# Patient Record
Sex: MALE | Race: WHITE | NOT HISPANIC OR LATINO | Employment: OTHER | URBAN - METROPOLITAN AREA
[De-identification: names, ages, dates, MRNs, and addresses within clinical notes are randomized per-mention and may not be internally consistent; named-entity substitution may affect disease eponyms.]

---

## 2017-01-02 ENCOUNTER — COMMUNICATION - HEALTHEAST (OUTPATIENT)
Dept: INTERNAL MEDICINE | Facility: CLINIC | Age: 57
End: 2017-01-02

## 2017-01-02 DIAGNOSIS — E78.5 HYPERLIPIDEMIA: ICD-10-CM

## 2017-06-13 ENCOUNTER — COMMUNICATION - HEALTHEAST (OUTPATIENT)
Dept: INTERNAL MEDICINE | Facility: CLINIC | Age: 57
End: 2017-06-13

## 2017-06-13 DIAGNOSIS — M10.9 GOUT: ICD-10-CM

## 2017-06-26 ENCOUNTER — COMMUNICATION - HEALTHEAST (OUTPATIENT)
Dept: INTERNAL MEDICINE | Facility: CLINIC | Age: 57
End: 2017-06-26

## 2018-04-10 ENCOUNTER — RECORDS - HEALTHEAST (OUTPATIENT)
Dept: ADMINISTRATIVE | Facility: OTHER | Age: 58
End: 2018-04-10

## 2020-02-10 ENCOUNTER — COMMUNICATION - HEALTHEAST (OUTPATIENT)
Dept: INTERNAL MEDICINE | Facility: CLINIC | Age: 60
End: 2020-02-10

## 2020-02-11 ENCOUNTER — OFFICE VISIT - HEALTHEAST (OUTPATIENT)
Dept: INTERNAL MEDICINE | Facility: CLINIC | Age: 60
End: 2020-02-11

## 2020-02-11 DIAGNOSIS — R03.0 ELEVATED BLOOD PRESSURE READING WITHOUT DIAGNOSIS OF HYPERTENSION: ICD-10-CM

## 2020-02-11 DIAGNOSIS — Z71.6 ENCOUNTER FOR TOBACCO USE CESSATION COUNSELING: ICD-10-CM

## 2020-02-11 DIAGNOSIS — M79.674 PAIN OF TOE OF RIGHT FOOT: ICD-10-CM

## 2020-02-11 DIAGNOSIS — E66.09 CLASS 2 OBESITY DUE TO EXCESS CALORIES WITHOUT SERIOUS COMORBIDITY WITH BODY MASS INDEX (BMI) OF 35.0 TO 35.9 IN ADULT: ICD-10-CM

## 2020-02-11 DIAGNOSIS — E66.812 CLASS 2 OBESITY DUE TO EXCESS CALORIES WITHOUT SERIOUS COMORBIDITY WITH BODY MASS INDEX (BMI) OF 35.0 TO 35.9 IN ADULT: ICD-10-CM

## 2020-02-11 LAB
ANION GAP SERPL CALCULATED.3IONS-SCNC: 7 MMOL/L (ref 5–18)
BUN SERPL-MCNC: 16 MG/DL (ref 8–22)
CALCIUM SERPL-MCNC: 9.3 MG/DL (ref 8.5–10.5)
CHLORIDE BLD-SCNC: 105 MMOL/L (ref 98–107)
CHOLEST SERPL-MCNC: 134 MG/DL
CO2 SERPL-SCNC: 27 MMOL/L (ref 22–31)
CREAT SERPL-MCNC: 1.11 MG/DL (ref 0.7–1.3)
FASTING STATUS PATIENT QL REPORTED: YES
GFR SERPL CREATININE-BSD FRML MDRD: >60 ML/MIN/1.73M2
GLUCOSE BLD-MCNC: 109 MG/DL (ref 70–125)
HBA1C MFR BLD: 5.7 % (ref 3.5–6)
HDLC SERPL-MCNC: 40 MG/DL
LDLC SERPL CALC-MCNC: 79 MG/DL
POTASSIUM BLD-SCNC: 5.1 MMOL/L (ref 3.5–5)
SODIUM SERPL-SCNC: 139 MMOL/L (ref 136–145)
TRIGL SERPL-MCNC: 75 MG/DL

## 2020-02-11 RX ORDER — VARENICLINE TARTRATE 1 MG/1
TABLET, FILM COATED ORAL
Qty: 60 TABLET | Refills: 2 | Status: SHIPPED | OUTPATIENT
Start: 2020-02-11 | End: 2022-07-19

## 2020-02-11 ASSESSMENT — MIFFLIN-ST. JEOR: SCORE: 1903.08

## 2020-02-11 ASSESSMENT — PATIENT HEALTH QUESTIONNAIRE - PHQ9: SUM OF ALL RESPONSES TO PHQ QUESTIONS 1-9: 3

## 2020-03-27 ENCOUNTER — COMMUNICATION - HEALTHEAST (OUTPATIENT)
Dept: INTERNAL MEDICINE | Facility: CLINIC | Age: 60
End: 2020-03-27

## 2020-03-27 ENCOUNTER — OFFICE VISIT - HEALTHEAST (OUTPATIENT)
Dept: INTERNAL MEDICINE | Facility: CLINIC | Age: 60
End: 2020-03-27

## 2020-03-27 DIAGNOSIS — Z87.891 FORMER TOBACCO USE: ICD-10-CM

## 2020-03-27 DIAGNOSIS — E66.812 CLASS 2 SEVERE OBESITY DUE TO EXCESS CALORIES WITH SERIOUS COMORBIDITY AND BODY MASS INDEX (BMI) OF 35.0 TO 35.9 IN ADULT (H): ICD-10-CM

## 2020-03-27 DIAGNOSIS — I25.10 CORONARY ARTERY DISEASE INVOLVING NATIVE CORONARY ARTERY OF NATIVE HEART WITHOUT ANGINA PECTORIS: ICD-10-CM

## 2020-03-27 DIAGNOSIS — E66.01 CLASS 2 SEVERE OBESITY DUE TO EXCESS CALORIES WITH SERIOUS COMORBIDITY AND BODY MASS INDEX (BMI) OF 35.0 TO 35.9 IN ADULT (H): ICD-10-CM

## 2021-05-27 ASSESSMENT — PATIENT HEALTH QUESTIONNAIRE - PHQ9: SUM OF ALL RESPONSES TO PHQ QUESTIONS 1-9: 3

## 2021-05-29 ENCOUNTER — RECORDS - HEALTHEAST (OUTPATIENT)
Dept: ADMINISTRATIVE | Facility: CLINIC | Age: 61
End: 2021-05-29

## 2021-05-30 ENCOUNTER — RECORDS - HEALTHEAST (OUTPATIENT)
Dept: ADMINISTRATIVE | Facility: CLINIC | Age: 61
End: 2021-05-30

## 2021-06-03 ENCOUNTER — RECORDS - HEALTHEAST (OUTPATIENT)
Dept: ADMINISTRATIVE | Facility: CLINIC | Age: 61
End: 2021-06-03

## 2021-06-04 VITALS
DIASTOLIC BLOOD PRESSURE: 82 MMHG | SYSTOLIC BLOOD PRESSURE: 132 MMHG | WEIGHT: 242 LBS | HEIGHT: 69 IN | BODY MASS INDEX: 35.84 KG/M2 | OXYGEN SATURATION: 99 % | HEART RATE: 60 BPM

## 2021-06-06 NOTE — TELEPHONE ENCOUNTER
Who is calling:  Patient, Levon Barron  Reason for Call:  Would like to know if Dr. Pinto would accept him as a new patient.  Date of last appointment with primary care: 2016  Okay to leave a detailed message: Yes

## 2021-06-06 NOTE — PROGRESS NOTES
[facility] Clinic Note  Levon Barron   59 y.o. male    Date of Visit: 2/11/2020  Chief Complaint   Patient presents with     Foot Pain     RT foot pain - more with walking     Smoking Cessation     Wants RX for chantix     Assessment/Plan  1. Encounter for tobacco use cessation counseling  PHQ-9 of 3.  Counseled on risk of developing major depressive symptoms.  Will check BMP to assess creatinine clearance.  We will follow-up in 6 weeks to evaluate progress.  Quit date will be after 1 week from initiation of medication.  - Basic Metabolic Panel; Future  - varenicline (CHANTIX STARTING MONTH BOX) 0.5 mg (11)- 1 mg (42) tablet; 1 wk before you stop smoking take 0.5mg daily on days 1-3, 0.5mg 2 times each day on days 4-7, then 1mg 2 times daily.  Dispense: 53 tablet; Refill: 0  - varenicline (CHANTIX) 1 mg tablet; Take 1 tab by mouth two times a day. Take after eating with a full glass of water. NOTE: Dispense as maintenance for refills only.  Dispense: 60 tablet; Refill: 2  - Basic Metabolic Panel    2. Class 2 obesity due to excess calories without serious comorbidity with body mass index (BMI) of 35.0 to 35.9 in adult  3. Elevated blood pressure reading without diagnosis of hypertension  On 5 mg of rosuvastatin, as higher doses resulted in MSK symptoms.  Provided patient instructions on lifestyle changes including diet and exercise, salt restriction and weight loss in order to help improve systolic blood pressure and overall cardiovascular health.  Encouraged him on goal to try to see smoking moving forward.  We will also check for diabetes.  - Glycosylated Hemoglobin A1c  - Lipid Cascade    4. Pain of toe of right foot  Likely hyperextension of the flexor digitorum brevis tendons.  Counseled on rest, ice, massage and acetaminophen.  Counseled to limit use of NSAIDs in the setting of his cardiovascular disease.       Much or all of the text in this note was generated through the use of Dragon  Dictate voice-to-text software. Errors in spelling or words which seem out of context are unintentional. Sound alike errors, in particular, may have escaped editing  Zelalem Rockwell MD    Return if symptoms worsen or fail to improve.    Subjective  This 59 y.o. old male presents after being lost to follow-up. Sees Dr. Levon Bennett (last seen 10/22/2019, /84, 241 pounds) at [facility].  Last seen in  clinic 4/22/2016.  History pertinent for CAD s/p PCI to proximal LAD 7/2015 (has apical LAD disease that was left alone, later underwent PCI to left circumflex extending into the second obtuse marginal branch), frequent PVCs s/p ablation in 2016, obesity, hypercholesterolemia, anxiety, former smoker status [Echo 2015 (EF 55%, borderline left atrial dilatation, mild TR), cardiac MR (mild increased concentric wall thickness, EF 73%), Holter 04/20/18 (SR with rare, noncomplex ectopy)].  Last lipids done in 2016,  HDL 46.  Here to discuss that his wife was dx with stage 4 cancer and due to hardship with that, also with work, etc. 9 months ago resumed smoking. Was on Chantix in the past, which helped stopped smoking in 2015. currently 2 ppw, and not on the weekend. Endorses anxiety 2/2 plavix, and an antidepressant made it worse. PHQ9.   Also complains of right foot pain. Endorses lots of walking - work and with family dog- 10 to 15K steps a day. Plantar felxion causes around the PIP joint, more below the toes. Present for a couple of weeks. No trauma, new footwear, swelling, skin changes and non tender. No numbness or tingling.on hurt when he walks, does not prevent him from walking and 2/10 in severity.     ROS A comprehensive review of systems was performed and was otherwise negative    Medications, allergies, and problem list were reviewed and updated    Exam  General appearance: Pleasant, nontoxic-appearing, no acute distress, alert and oriented x4  Vitals:     02/11/20 0804   BP: 132/82   Pulse: 60   SpO2: 99%   RESPIRATORY: Bilaterally with no crackles, wheezing or rhonchi  CARDIOVASCULAR: Regular S1 and S2.  Radial pulses intact.  No edema.  GASTROINTESTINAL: NABS, soft, nontender, nondistended, no hepatomegaly  MUSCULOSKELETAL: Skeletal configuration was normal and muscle mass was normal for age. Joint appearance was overall normal.  Reproduction of pain proximal to the PIP joint, along the proximal phalanges with extension of the second and third toe on the right.  No palpable mass, lesion or ulcer, and no tenderness.  Squeeze test along metatarsals with no reproducible pain.  LYMPHATIC: There were no enlarged nodes.  SKIN/HAIR/NAILS: Hair and nails were normal.  Mild onychomycosis in the right great toe.  No maceration or ulcers between toes on the right.  NEUROLOGIC: Alert and oriented to person, place, time, and circumstance. Speech was normal.   PSYCHIATRIC:  Mood and affect were normal and the patient had normal recent and remote memory. The patient's judgment and insight were normal.  PHQ 9 of 3    Additional Information   Current Outpatient Medications   Medication Sig Dispense Refill     aspirin 81 MG EC tablet Take 81 mg by mouth daily.       rosuvastatin (CRESTOR) 5 MG tablet TAKE 1 TABLET BY MOUTH AT BEDTIME 30 tablet 0     co-enzyme Q-10 30 mg capsule Take 30 mg by mouth daily.       nitroglycerin (NITROSTAT) 0.4 MG SL tablet Place 0.4 mg under the tongue.       varenicline (CHANTIX STARTING MONTH BOX) 0.5 mg (11)- 1 mg (42) tablet 1 wk before you stop smoking take 0.5mg daily on days 1-3, 0.5mg 2 times each day on days 4-7, then 1mg 2 times daily. 53 tablet 0     varenicline (CHANTIX) 1 mg tablet Take 1 tab by mouth two times a day. Take after eating with a full glass of water. NOTE: Dispense as maintenance for refills only. 60 tablet 2     No current facility-administered medications for this visit.      Allergies   Allergen Reactions     Atorvastatin  Myalgia     Pravastatin Myalgia     Statins-Hmg-Coa Reductase Inhibitors Myalgia     Intolerance     Social History     Social History Narrative    . Manda Arteaga current dealing with stage 4 cancer. 2 sons. 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

## 2021-06-06 NOTE — TELEPHONE ENCOUNTER
Called pt and advised that he can be seen and he said he will call back when ready to make appt.    He did see Dr Rockwell at [facility] today.  I advised that Dr Pinto could do the follow up if he wanted him to.

## 2021-06-07 NOTE — PROGRESS NOTES
"Levon Barron is a 59 y.o. male who is being evaluated via a billable telephone visit.      The patient has been notified of following:     \"This telephone visit will be conducted via a call between you and your physician/provider. We have found that certain health care needs can be provided without the need for a physical exam.  This service lets us provide the care you need with a short phone conversation.  If a prescription is necessary we can send it directly to your pharmacy.  If lab work is needed we can place an order for that and you can then stop by our lab to have the test done at a later time.     If during the course of the call the physician/provider feels a telephone visit is not appropriate, you will not be charged for this service.\"     Levon Barron complains of    Chief Complaint   Patient presents with     Follow-up     foot pain gone - Chantix going well - no side effects     I have reviewed and updated the patient's Past Medical History, Social History, Family History and Medication List.    ALLERGIES  Atorvastatin; Pravastatin; and Statins-hmg-coa reductase inhibitors    Additional provider notes:   States he is doing well overall. Is sheltering in place. Denies any exposure to people with Covid-19. Has a tickle in his throat with a clearing cough, for roughly 1 week. Otherwise no f/s/c. Only in the AM and no foul test taste in mouth and resolves by the midday. Somewhat related to laying flat.   From a tobacco cessation standpoint, states the Chantix is working well for him. States he has not had a cigarette in 3-4 weeks. Currently on 1 mg twice a day. Mood is good. Endorses some stress with the covid-19. Thinks his last cigarettes was 2/21/20.   Trying to lose some weight, and it is difficult for him right now as he is homebound. Difficult to make goal of 43009 steps. States that his night time appetite is massive. Has a gym, but prefers to be outside. [place] walking through, 2 to 3 times a day.   Pain in the right foot has improved with conservative management.     Assessment/Plan:  1. Former tobacco use  Doing great.  Discussed completing therapy after 12 weeks of Chantix.  Changed history to former tobacco use.  End date roughly 5/21/2020.    2. Class 2 severe obesity due to excess calories with serious comorbidity and body mass index (BMI) of 35.0 to 35.9 in adult (H)  3. Coronary artery disease involving native coronary artery of native heart without angina pectoris  Has history of elevated blood pressures in the past.  Counseled on importance of incorporating some light strength training, considering intermittent fasting as well as avoiding foods with a high glycemic index/non-peritoneal foods as they are designed to promote continuous eating.  We will try to eat more foods with higher water content and more soluble fiber.  Also recommended developing goals such as 2 pounds per week to help motivate him to adhere to these small lifestyle changes noted to lose meaningful weight.  This will also decrease his risk of developing high cholesterol and will probably also decrease his ambulatory blood pressure readings    Phone call duration: 23 minutes   8:18 AM-8:41 AM

## 2021-06-18 NOTE — PATIENT INSTRUCTIONS - HE
Patient Instructions by Zelalem Rockwell MD at 2/11/2020  8:00 AM     Author: Zelalem Rockwell MD Service: -- Author Type: Physician    Filed: 2/11/2020  8:42 AM Encounter Date: 2/11/2020 Status: Signed    : Zelalem Rockwell MD (Physician)       Patient Education   Regarding your right foot, I think you hyperextended the toe flexors. This can improve with rest, massage, ice. Try to use acetaminophen for the pain, and limit use of NSAIDs in general. Aspirin is an NSAID, but 81 mg is okay and the pros outweigh the cons  Established High Blood Pressure    High blood pressure (hypertension) is a chronic disease. Often, healthcare providers dont know what causes it. But it can be caused by certain health conditions and medicines.  If you have high blood pressure, you may not have any symptoms. If you do have symptoms, they may include headache, dizziness, changes in your vision, chest pain, and shortness of breath. But even without symptoms, high blood pressure thats not treated raises your risk for heart attack, heart failure, and stroke. High blood pressure is a serious health risk and shouldnt be ignored.  Blood pressure measurements are given as 2 numbers. Systolic blood pressure is the upper number. This is the pressure when the heart contracts. Diastolic blood pressure is the lower number. This is the pressure when the heart relaxes between beats. You will see your blood pressure readings written together. For example, a person with a systolic pressure of 118 and a diastolic pressure of 78 will have 118/78 written in the medical record.  Blood pressure is categorized as normal, elevated, or stage 1 or stage 2 high blood pressure:    Normal blood pressure is systolic of less than 120 and diastolic of less than 80 (120/80)    Elevated blood pressure is systolic of 120 to 129 and diastolic less than 80    Stage 1 high blood pressure is systolic is 130 to 139 or diastolic between 80  to 89  Home care  If you have high blood pressure, follow these home care guidelines to help lower your blood pressure. If you are taking medicines for high blood pressure, these methods may reduce or end your need for medicines in the future.    Start a weight-loss program if you are overweight.    Cut back on how much salt you get in your diet. Heres how to do this:  ? Dont eat foods that have a lot of salt. These include olives, pickles, smoked meats, and salted potato chips.  ? Dont add salt to your food at the table.  ? Use only small amounts of salt when cooking.    Start an exercise program. Talk with your healthcare provider about the type of exercise program that would be best for you. It doesn't have to be hard. Even brisk walking for 20 minutes 3 times a week is a good form of exercise.    Dont take medicines that stimulate the heart. This includes many over-the-counter cold and sinus decongestant pills and sprays, as well as diet pills. Check the warnings about high blood pressure on the label. Before buying any over-the-counter medicines or supplements, always ask the pharmacist about the product's potential interaction with your high blood pressure and your high blood pressure medicines.    Stimulants such as amphetamine or cocaine could be deadly for someone with high blood pressure. Never take these.    Limit how much caffeine you get in your diet. Switch to caffeine-free products.    Stop smoking. If you are a long-time smoker, this can be hard. Talk to your healthcare provider about medicines and nicotine replacement options to help you. Also, enroll in a stop-smoking program to make it more likely that you will quit for good.    Learn how to handle stress. This is an important part of any program to lower blood pressure. Learn about relaxation methods like meditation, yoga, or biofeedback.    If your provider prescribed medicines, take them exactly as directed. Missing doses may cause your blood  pressure get out of control.    If you miss a dose or doses, check with your healthcare provider or pharmacist about what to do.    Consider buying an automatic blood pressure machine to check your blood pressure at home. Ask your provider for a recommendation. You can get one of these at most pharmacies.  The American Heart Association recommends the following guidelines for home blood pressure monitoring:    Don't smoke or drink coffee for 30 minutes before taking your blood pressure.    Go to the bathroom before the test.    Relax for 5 minutes before taking the measurement.    Sit with your back supported (don't sit on a couch or soft chair); keep your feet on the floor uncrossed. Place your arm on a solid flat surface (like a table) with the upper part of the arm at heart level. Place the middle of the cuff directly above the bend of the elbow. Check the monitor's instruction manual for an illustration.    Take multiple readings. When you measure, take 2 to 3 readings one minute apart and record all of the results.    Take your blood pressure at the same time every day, or as your healthcare provider recommends.    Record the date, time, and blood pressure reading.    Take the record with you to your next medical appointment. If your blood pressure monitor has a built-in memory, simply take the monitor with you to your next appointment.    Call your provider if you have several high readings. Don't be frightened by a single high blood pressure reading, but if you get several high readings, check in with your healthcare provider.    Note: When blood pressure reaches a systolic (top number) of 180 or higher OR diastolic (bottom number) of 110 or higher, seek emergency medical treatment.  Follow-up care  You will need to see your healthcare provider regularly. This is to check your blood pressure and to make changes to your medicines. Make a follow-up appointment as directed. Bring the record of your home blood  pressure readings to the appointment.  When to seek medical advice  Call your healthcare provider right away if any of these occur:    Blood pressure reaches a systolic (upper number) of 180 or higher OR a diastolic (bottom number) of 110 or higher    Chest pain or shortness of breath    Severe headache    Throbbing or rushing sound in the ears    Nosebleed    Sudden severe pain in your belly (abdomen)    Extreme drowsiness, confusion, or fainting    Dizziness or spinning sensation (vertigo)    Weakness of an arm or leg or one side of the face    You have problems speaking or seeing

## 2021-06-26 ENCOUNTER — HEALTH MAINTENANCE LETTER (OUTPATIENT)
Age: 61
End: 2021-06-26

## 2021-10-16 ENCOUNTER — HEALTH MAINTENANCE LETTER (OUTPATIENT)
Age: 61
End: 2021-10-16

## 2022-05-05 ENCOUNTER — TRANSFERRED RECORDS (OUTPATIENT)
Dept: HEALTH INFORMATION MANAGEMENT | Facility: CLINIC | Age: 62
End: 2022-05-05
Payer: COMMERCIAL

## 2022-06-09 ENCOUNTER — TRANSFERRED RECORDS (OUTPATIENT)
Dept: HEALTH INFORMATION MANAGEMENT | Facility: CLINIC | Age: 62
End: 2022-06-09

## 2022-07-14 ENCOUNTER — OFFICE VISIT (OUTPATIENT)
Dept: URGENT CARE | Facility: URGENT CARE | Age: 62
End: 2022-07-14
Payer: COMMERCIAL

## 2022-07-14 VITALS
OXYGEN SATURATION: 97 % | TEMPERATURE: 97 F | HEART RATE: 66 BPM | SYSTOLIC BLOOD PRESSURE: 127 MMHG | DIASTOLIC BLOOD PRESSURE: 87 MMHG

## 2022-07-14 DIAGNOSIS — M10.9 ACUTE GOUTY ARTHRITIS: Primary | ICD-10-CM

## 2022-07-14 PROCEDURE — 99214 OFFICE O/P EST MOD 30 MIN: CPT | Performed by: PHYSICIAN ASSISTANT

## 2022-07-14 RX ORDER — PREDNISONE 20 MG/1
40 TABLET ORAL DAILY
Qty: 10 TABLET | Refills: 0 | Status: SHIPPED | OUTPATIENT
Start: 2022-07-14 | End: 2022-07-19

## 2022-07-14 NOTE — PATIENT INSTRUCTIONS
Patient was educated on the natural course of condition. Gout is caused by having too much uric acid chemical in the blood. Uric acid can form sharp crystals that build up in joints which causes a gout attack. Take medications as prescribed. Side effects discussed. Conservative measures discussed including warm compresses, low purine diet, and over-the-counter analgesics (Tylenol or Ibuprofen). See your primary care provider if symptoms worsen or do not improve in five days. Seek emergency care if you develop severe pain, or fever.

## 2022-07-14 NOTE — PROGRESS NOTES
URGENT CARE VISIT:    SUBJECTIVE:   Chief Complaint   Patient presents with     Arthritis     gout      Levon Barron is a 61 year old male who presents with a chief complaint of left great toe pain and redness.  Symptoms began 10 day(s) ago, are moderate and sudden onset  Pain has improved.  Pain exacerbated by touch. Relieved by rest.  He treated it initially with nothing. This is not the first time this type of injury has occurred to this patient. He has history of gout.    PMH:   Past Medical History:   Diagnosis Date     Former tobacco use      Hyperlipidemia      Obesity      Allergies: Atorvastatin, Pravastatin, and Statins-hmg-coa reductase inhibitors [hmg-coa-r inhibitors]   Medications:   Current Outpatient Medications   Medication Sig Dispense Refill     predniSONE (DELTASONE) 20 MG tablet Take 2 tablets (40 mg) by mouth daily for 5 days 10 tablet 0     aspirin 81 MG EC tablet [ASPIRIN 81 MG EC TABLET] Take 81 mg by mouth daily.       co-enzyme Q-10 30 mg capsule [CO-ENZYME Q-10 30 MG CAPSULE] Take 30 mg by mouth daily.       nitroglycerin (NITROSTAT) 0.4 MG SL tablet [NITROGLYCERIN (NITROSTAT) 0.4 MG SL TABLET] Place 0.4 mg under the tongue.       rosuvastatin (CRESTOR) 5 MG tablet [ROSUVASTATIN (CRESTOR) 5 MG TABLET] TAKE 1 TABLET BY MOUTH AT BEDTIME 30 tablet 0     varenicline (CHANTIX STARTING MONTH BOX) 0.5 mg (11)- 1 mg (42) tablet [VARENICLINE (CHANTIX STARTING MONTH BOX) 0.5 MG (11)- 1 MG (42) TABLET] 1 wk before you stop smoking take 0.5mg daily on days 1-3, 0.5mg 2 times each day on days 4-7, then 1mg 2 times daily. 53 tablet 0     varenicline (CHANTIX) 1 mg tablet [VARENICLINE (CHANTIX) 1 MG TABLET] Take 1 tab by mouth two times a day. Take after eating with a full glass of water. NOTE: Dispense as maintenance for refills only. 60 tablet 2     Social History:   Social History     Tobacco Use     Smoking status: Former Smoker     Packs/day: 0.15     Types: Cigarettes, Cigarettes     Smokeless  tobacco: Never Used   Substance Use Topics     Alcohol use: Yes     Alcohol/week: 1.7 standard drinks       ROS:  Review of systems negative except as stated above.    OBJECTIVE:  /87 (BP Location: Right arm, Patient Position: Sitting, Cuff Size: Adult Large)   Pulse 66   Temp 97  F (36.1  C)   SpO2 97%   GENERAL APPEARANCE: healthy, alert and no distress  MUSCULOSKELETAL: mild TTP over left great MTP joint  EXTREMITIES: peripheral pulses normal  SKIN: moderate erythema and edema over left great MTP joint  NEURO: sensation intact       ASSESSMENT:    ICD-10-CM    1. Acute gouty arthritis  M10.9 predniSONE (DELTASONE) 20 MG tablet       PLAN:  Patient Instructions   Patient was educated on the natural course of condition. Gout is caused by having too much uric acid chemical in the blood. Uric acid can form sharp crystals that build up in joints which causes a gout attack. Take medications as prescribed. Side effects discussed. Conservative measures discussed including warm compresses, low purine diet, and over-the-counter analgesics (Tylenol or Ibuprofen). See your primary care provider if symptoms worsen or do not improve in five days. Seek emergency care if you develop severe pain, or fever.  Patient verbalized understanding and is agreeable to plan. The patient was discharged ambulatory and in stable condition.    Denise Bah PA-C on 7/14/2022 at 6:31 PM

## 2022-07-17 ENCOUNTER — HEALTH MAINTENANCE LETTER (OUTPATIENT)
Age: 62
End: 2022-07-17

## 2022-07-18 ASSESSMENT — ENCOUNTER SYMPTOMS
MYALGIAS: 0
EYE PAIN: 0
NERVOUS/ANXIOUS: 0
COUGH: 0
CHILLS: 0
HEADACHES: 0
NAUSEA: 0
DYSURIA: 0
DIARRHEA: 0
ABDOMINAL PAIN: 0
DIZZINESS: 0
PALPITATIONS: 0
HEMATURIA: 0
SORE THROAT: 0
FEVER: 0
HEARTBURN: 0
SHORTNESS OF BREATH: 0
JOINT SWELLING: 0
HEMATOCHEZIA: 0
FREQUENCY: 0
WEAKNESS: 0
ARTHRALGIAS: 1
PARESTHESIAS: 0
CONSTIPATION: 0

## 2022-07-19 ENCOUNTER — OFFICE VISIT (OUTPATIENT)
Dept: INTERNAL MEDICINE | Facility: CLINIC | Age: 62
End: 2022-07-19
Payer: COMMERCIAL

## 2022-07-19 VITALS
SYSTOLIC BLOOD PRESSURE: 116 MMHG | HEART RATE: 61 BPM | DIASTOLIC BLOOD PRESSURE: 78 MMHG | HEIGHT: 69 IN | WEIGHT: 231.8 LBS | TEMPERATURE: 97.1 F | OXYGEN SATURATION: 97 % | BODY MASS INDEX: 34.33 KG/M2

## 2022-07-19 DIAGNOSIS — Z11.59 NEED FOR HEPATITIS C SCREENING TEST: ICD-10-CM

## 2022-07-19 DIAGNOSIS — E78.5 HYPERLIPIDEMIA LDL GOAL <70: ICD-10-CM

## 2022-07-19 DIAGNOSIS — Z86.0100 HISTORY OF COLONIC POLYPS: ICD-10-CM

## 2022-07-19 DIAGNOSIS — E66.811 CLASS 1 OBESITY WITH SERIOUS COMORBIDITY AND BODY MASS INDEX (BMI) OF 34.0 TO 34.9 IN ADULT, UNSPECIFIED OBESITY TYPE: ICD-10-CM

## 2022-07-19 DIAGNOSIS — Z12.5 SCREENING FOR PROSTATE CANCER: ICD-10-CM

## 2022-07-19 DIAGNOSIS — Z00.01 ENCOUNTER FOR ROUTINE ADULT MEDICAL EXAM WITH ABNORMAL FINDINGS: ICD-10-CM

## 2022-07-19 DIAGNOSIS — M10.9 ACUTE GOUT OF LEFT FOOT, UNSPECIFIED CAUSE: ICD-10-CM

## 2022-07-19 LAB
ERYTHROCYTE [DISTWIDTH] IN BLOOD BY AUTOMATED COUNT: 13.2 % (ref 10–15)
HCT VFR BLD AUTO: 48.2 % (ref 40–53)
HCV AB SERPL QL IA: NONREACTIVE
HGB BLD-MCNC: 16.2 G/DL (ref 13.3–17.7)
MCH RBC QN AUTO: 30.5 PG (ref 26.5–33)
MCHC RBC AUTO-ENTMCNC: 33.6 G/DL (ref 31.5–36.5)
MCV RBC AUTO: 91 FL (ref 78–100)
PLATELET # BLD AUTO: 230 10E3/UL (ref 150–450)
RBC # BLD AUTO: 5.31 10E6/UL (ref 4.4–5.9)
WBC # BLD AUTO: 6.7 10E3/UL (ref 4–11)

## 2022-07-19 PROCEDURE — 80053 COMPREHEN METABOLIC PANEL: CPT | Performed by: INTERNAL MEDICINE

## 2022-07-19 PROCEDURE — 99396 PREV VISIT EST AGE 40-64: CPT | Performed by: INTERNAL MEDICINE

## 2022-07-19 PROCEDURE — 84550 ASSAY OF BLOOD/URIC ACID: CPT | Performed by: INTERNAL MEDICINE

## 2022-07-19 PROCEDURE — 80061 LIPID PANEL: CPT | Performed by: INTERNAL MEDICINE

## 2022-07-19 PROCEDURE — 86803 HEPATITIS C AB TEST: CPT | Performed by: INTERNAL MEDICINE

## 2022-07-19 PROCEDURE — 36415 COLL VENOUS BLD VENIPUNCTURE: CPT | Performed by: INTERNAL MEDICINE

## 2022-07-19 PROCEDURE — 84439 ASSAY OF FREE THYROXINE: CPT | Performed by: INTERNAL MEDICINE

## 2022-07-19 PROCEDURE — 99213 OFFICE O/P EST LOW 20 MIN: CPT | Mod: 25 | Performed by: INTERNAL MEDICINE

## 2022-07-19 PROCEDURE — 85027 COMPLETE CBC AUTOMATED: CPT | Performed by: INTERNAL MEDICINE

## 2022-07-19 PROCEDURE — G0103 PSA SCREENING: HCPCS | Performed by: INTERNAL MEDICINE

## 2022-07-19 PROCEDURE — 84443 ASSAY THYROID STIM HORMONE: CPT | Performed by: INTERNAL MEDICINE

## 2022-07-19 ASSESSMENT — ENCOUNTER SYMPTOMS
WEAKNESS: 0
SORE THROAT: 0
JOINT SWELLING: 0
HEADACHES: 0
MYALGIAS: 0
PARESTHESIAS: 0
FREQUENCY: 0
HEARTBURN: 0
DIARRHEA: 0
ARTHRALGIAS: 1
EYE PAIN: 0
FEVER: 0
HEMATURIA: 0
COUGH: 0
CONSTIPATION: 0
CHILLS: 0
ABDOMINAL PAIN: 0
SHORTNESS OF BREATH: 0
HEMATOCHEZIA: 0
PALPITATIONS: 0
NERVOUS/ANXIOUS: 0
DIZZINESS: 0
NAUSEA: 0
DYSURIA: 0

## 2022-07-19 NOTE — PATIENT INSTRUCTIONS
Continue current meds. Will address refill after labs  Labs today as ordered  Reduce calorie/carbohydrate (sugar, bread, potato, pasta, rice, alcohol etc)  intake in diet.  Increase color on your plate with fruits and vegetables. Increase  frequency of walking or other aerobic exercise as able (goal is daily)  Vaccinations: Tetanus (Td) vaccine - get at pharmacy  Check with insurance or speak with your pharmacist re: Shingrix vaccine coverage for shingles prevention.  This is a 2 shot series done 2-6 months apart  I would recommend you receive an influenza (flu) vaccine  this Fall (October)   I would recommend a covid booster vaccination. You may have it done at any pharmacy. If you wish to have it done at a [facility] pharmacy, then go to [email or web address] to schedule a vaccination appointment  Put alcohol aside for now to  Reduce uric acid. If recurrent gout flares, then possible Allopurinol for prevention  Pt was informed regarding extra E&M billing for management of new or established medical issues not related to today's wellness visit

## 2022-07-22 LAB
ALBUMIN SERPL-MCNC: 7.5 G/DL (ref 3.4–5)
ALP SERPL-CCNC: 52 U/L (ref 40–150)
ALT SERPL W P-5'-P-CCNC: 35 U/L (ref 0–70)
ANION GAP SERPL CALCULATED.3IONS-SCNC: 5 MMOL/L (ref 3–14)
AST SERPL W P-5'-P-CCNC: 21 U/L (ref 0–45)
BILIRUB SERPL-MCNC: 0.3 MG/DL (ref 0.2–1.3)
BUN SERPL-MCNC: 25 MG/DL (ref 7–30)
CALCIUM SERPL-MCNC: 9.4 MG/DL (ref 8.5–10.1)
CHLORIDE BLD-SCNC: 105 MMOL/L (ref 94–109)
CHOLEST SERPL-MCNC: 118 MG/DL
CO2 SERPL-SCNC: 27 MMOL/L (ref 20–32)
CREAT SERPL-MCNC: 1.11 MG/DL (ref 0.66–1.25)
FASTING STATUS PATIENT QL REPORTED: NO
GFR SERPL CREATININE-BSD FRML MDRD: 76 ML/MIN/1.73M2
GLUCOSE BLD-MCNC: 81 MG/DL (ref 70–99)
HDLC SERPL-MCNC: 49 MG/DL
LDLC SERPL CALC-MCNC: 56 MG/DL
NONHDLC SERPL-MCNC: 69 MG/DL
POTASSIUM BLD-SCNC: 4.9 MMOL/L (ref 3.4–5.3)
PROT SERPL-MCNC: 7 G/DL (ref 6.8–8.8)
SODIUM SERPL-SCNC: 137 MMOL/L (ref 133–144)
TRIGL SERPL-MCNC: 66 MG/DL
TSH SERPL DL<=0.005 MIU/L-ACNC: 79.18 MU/L (ref 0.4–4)
URATE SERPL-MCNC: 7.3 MG/DL (ref 3.5–7.2)

## 2022-07-23 LAB
PSA SERPL-MCNC: 0.76 UG/L (ref 0–4)
T4 FREE SERPL-MCNC: 0.67 NG/DL (ref 0.76–1.46)

## 2022-09-25 ENCOUNTER — HEALTH MAINTENANCE LETTER (OUTPATIENT)
Age: 62
End: 2022-09-25

## 2023-02-23 ENCOUNTER — NURSE TRIAGE (OUTPATIENT)
Dept: NURSING | Facility: CLINIC | Age: 63
End: 2023-02-23
Payer: COMMERCIAL

## 2023-02-23 NOTE — TELEPHONE ENCOUNTER
Nurse Triage SBAR    Is this a 2nd Level Triage? NO    Situation: Gout flare for 3-4 days    Background: consent on file - with patient  In [place] currently- this was not relayed to writer until after triage has been completed.     Assessment: gout flare- right great toe   3-4 days ago  Rating pain 9/10 - states he is limping due to the pain  Taken ibuprofen for pain- not helping   States he has taken prednisone in the past and would like a prescription.     Protocol Recommended Disposition:   Go To Office Now, See More Appropriate Protocol    Recommendation: Advised that they should be seen - wife indicates that they are out of state in [place] currently- advised that due to licensing restrictions triage cannot continue. Advised that providers have similar licensing restrictions and will not be able to assist and that patient needs to be seen where they are at. Wife verbalizes understanding and then ends the call.      patient needs to be seen where they are    Does the patient meet one of the following criteria for ADS visit consideration? No    Reason for Disposition    Toe pain is main symptom    SEVERE pain (e.g., excruciating, unable to do any normal activities) and not improved after 2 hours of pain medicine    Additional Information    Negative: Followed an ankle or foot injury    Negative: Followed an injury    Negative: Wound looks infected    Negative: Caused by an animal bite    Negative: Caused by frostbite    Negative: Athlete's Foot suspected (i.e., itchy red rash in web space between toes)    Negative: Foot pain is main symptom    Negative: Foot is cool or blue in comparison to other foot    Negative: Purple or black skin on toe  (Exception: Simple recalled injury with bruise.)    Negative: Patient sounds very sick or weak to the triager    Protocols used: FOOT PAIN-A-OH, TOE PAIN-A-OH

## 2023-10-15 ENCOUNTER — HEALTH MAINTENANCE LETTER (OUTPATIENT)
Age: 63
End: 2023-10-15

## 2023-12-06 ENCOUNTER — PATIENT OUTREACH (OUTPATIENT)
Dept: GASTROENTEROLOGY | Facility: CLINIC | Age: 63
End: 2023-12-06
Payer: COMMERCIAL

## 2024-04-19 ENCOUNTER — OFFICE VISIT (OUTPATIENT)
Dept: INTERNAL MEDICINE | Facility: CLINIC | Age: 64
End: 2024-04-19
Payer: COMMERCIAL

## 2024-04-19 ENCOUNTER — DOCUMENTATION ONLY (OUTPATIENT)
Dept: INTERNAL MEDICINE | Facility: CLINIC | Age: 64
End: 2024-04-19

## 2024-04-19 VITALS
BODY MASS INDEX: 35.73 KG/M2 | HEART RATE: 59 BPM | HEIGHT: 69 IN | WEIGHT: 241.2 LBS | DIASTOLIC BLOOD PRESSURE: 88 MMHG | TEMPERATURE: 97.8 F | OXYGEN SATURATION: 96 % | SYSTOLIC BLOOD PRESSURE: 138 MMHG

## 2024-04-19 DIAGNOSIS — Z00.01 ENCOUNTER FOR ROUTINE ADULT MEDICAL EXAM WITH ABNORMAL FINDINGS: Primary | ICD-10-CM

## 2024-04-19 DIAGNOSIS — Z12.5 SPECIAL SCREENING FOR MALIGNANT NEOPLASM OF PROSTATE: ICD-10-CM

## 2024-04-19 DIAGNOSIS — E66.01 SEVERE OBESITY WITH BODY MASS INDEX (BMI) OF 35.0 TO 39.9 WITH COMORBIDITY (H): ICD-10-CM

## 2024-04-19 DIAGNOSIS — E03.9 HYPOTHYROIDISM, UNSPECIFIED TYPE: ICD-10-CM

## 2024-04-19 DIAGNOSIS — E79.0 ELEVATED URIC ACID IN BLOOD: ICD-10-CM

## 2024-04-19 DIAGNOSIS — E78.5 HYPERLIPIDEMIA LDL GOAL <70: ICD-10-CM

## 2024-04-19 DIAGNOSIS — R73.9 BLOOD GLUCOSE ELEVATED: ICD-10-CM

## 2024-04-19 DIAGNOSIS — R03.0 ELEVATED BP WITHOUT DIAGNOSIS OF HYPERTENSION: ICD-10-CM

## 2024-04-19 DIAGNOSIS — I25.10 CORONARY ARTERY DISEASE INVOLVING NATIVE CORONARY ARTERY OF NATIVE HEART WITHOUT ANGINA PECTORIS: ICD-10-CM

## 2024-04-19 DIAGNOSIS — Z23 NEED FOR PROPHYLACTIC VACCINATION WITH TETANUS-DIPHTHERIA (TD): ICD-10-CM

## 2024-04-19 PROBLEM — E66.812 CLASS 2 SEVERE OBESITY DUE TO EXCESS CALORIES WITH SERIOUS COMORBIDITY IN ADULT (H): Status: ACTIVE | Noted: 2024-04-19

## 2024-04-19 PROCEDURE — 90714 TD VACC NO PRESV 7 YRS+ IM: CPT | Performed by: INTERNAL MEDICINE

## 2024-04-19 PROCEDURE — 99396 PREV VISIT EST AGE 40-64: CPT | Mod: 25 | Performed by: INTERNAL MEDICINE

## 2024-04-19 PROCEDURE — 90471 IMMUNIZATION ADMIN: CPT | Performed by: INTERNAL MEDICINE

## 2024-04-19 PROCEDURE — 99214 OFFICE O/P EST MOD 30 MIN: CPT | Mod: 25 | Performed by: INTERNAL MEDICINE

## 2024-04-19 SDOH — HEALTH STABILITY: PHYSICAL HEALTH: ON AVERAGE, HOW MANY DAYS PER WEEK DO YOU ENGAGE IN MODERATE TO STRENUOUS EXERCISE (LIKE A BRISK WALK)?: 6 DAYS

## 2024-04-19 ASSESSMENT — SOCIAL DETERMINANTS OF HEALTH (SDOH): HOW OFTEN DO YOU GET TOGETHER WITH FRIENDS OR RELATIVES?: TWICE A WEEK

## 2024-04-19 NOTE — PROGRESS NOTES
Please place or confirm orders for upcoming lab appointment on Monday 04/22/24.    Please sign pending lab orders. Thank you.    Janelle ABDALLA

## 2024-04-19 NOTE — PROGRESS NOTES
Preventive Care Visit  [facility]  Lior Smith MD, Internal Medicine  Apr 19, 2024      ASSESSMENT:   1. Encounter for routine adult medical exam with abnormal findings  Screening labs as ordered.  Discussed Shingrix and Prevnar vaccinations.  Patient [place] COVID vaccinations.  Colon cancer screening due again in 1 year.  Prostate cancer screening as below  - Comprehensive metabolic panel; Future  - CBC with platelets; Future    2. Coronary artery disease involving native coronary artery of native heart without angina pectoris  Denies chest pain with exertional activity.  Continue medical management per cardiology    3. Blood glucose elevated  Blood sugar from outside lab 114.  Labs as ordered to assess for onset of diabetes.  Counseled regarding carbohydrate reduction  - Hemoglobin A1c; Future  - TSH with free T4 reflex; Future  - Comprehensive metabolic panel; Future    4. Hypothyroidism, unspecified type  Previous elevated TSH.  Recheck thyroid function.  Remains elevated, start levothyroxine as will contribute to weight gain, glucose elevation, etc.  - TSH with free T4 reflex; Future  - Anti thyroglobulin antibody; Future    5. Hyperlipidemia LDL goal <70  Recent lipids done at [facility] clinic and at goal.  Continue rosuvastatin per cardiology    6. Elevated uric acid in blood  Denies recent gout symptoms.  Recheck lab.  - Uric acid; Future    7. Severe obesity with body mass index (BMI) of 35.0 to 39.9 with comorbidity (H)  Contributing hyperlipidemia, elevated glucose, elevated uric acid.  Weight increased.  Await thyroid function lab results.  Counseled regarding carbohydrate/calorie reduction.  Continue exercise  - TSH with free T4 reflex; Future  - Anti thyroglobulin antibody; Future  - Comprehensive metabolic panel; Future    8. Special screening for malignant neoplasm of prostate  Candidate for screening  - Prostate Specific Antigen Screen; Future    9. Need for prophylactic vaccination  with tetanus-diphtheria (Td)  Candidate for tetanus vaccination booster  - TD,PF 7+(TENIVAC)     10. Elevated BP without diagnosis of hypertension  Blood pressure 124/84 at cardiology appointment last month.  Goal < 130/80. Elevated today. Will get recheck through pharmacy when has lab appt. If thyroid function found to be low, will defer BP med for now and concentrate on weight loss to see effect with minimal DBP elevation more recently      PLAN:  Td tetanus vaccination today  Would recommend Prevnar 20 vaccination for pneumonia risk reduction  Patient declines COVID vaccination  Consider Shingrix vaccine coverage for additional shingles prevention.  This is a 2 shot series done 2-6 months apart. Check with insurance or speak with your pharmacist re: insurance coverage first  Continue rosuvastatin management for cholesterol from your cardiologist  Colonoscopy screening due again in 1 year  Call  [phone] or use [employer] to schedule a future lab appointment  fasting in next couple weeks   For fasting labs, please refrain from eating for 8 hours or more.   Drink 2 glasses of water before your lab appointment. It is fine to take your  oral medications on the morning of the lab test as usual  Get a blood pressure recheck   in  the next couple  weeks at the  [facility] pharmacy when here for future lab appointment. You will need to go to [email or web address] to schedule the blood pressure check appointment.  Continue current medications  Reduce calorie/carbohydrate (sugar, bread, potato, pasta, rice, alcohol etc)  intake in diet.  Increase color on your plate with vegetables.Continue frequency of walking or other aerobic exercise   Pt was informed regarding extra E&M billing for management of new or established medical issues not related to today's wellness/screening visit             America Dos Santos is a 63 year old, presenting for the following:  Physical   And follow-up issues as below       Health  Care Directive  Patient does not have a Health Care Directive or Living Will: Discussed advance care planning with patient; however, patient declined at this time.    HPI         4/19/2024   General Health   How would you rate your overall physical health? Good   Feel stress (tense, anxious, or unable to sleep) Rather much   (!) STRESS CONCERN      4/19/2024   Nutrition   Three or more servings of calcium each day? Yes   Diet: I don't know   How many servings of fruit and vegetables per day? (!) 2-3   How many sweetened beverages each day? 0-1         4/19/2024   Exercise   Days per week of moderate/strenous exercise 6 days         4/19/2024   Social Factors   Frequency of gathering with friends or relatives Twice a week   Worry food won't last until get money to buy more No   Food not last or not have enough money for food? No   Do you have housing?  Yes   Are you worried about losing your housing? No   Lack of transportation? No   Unable to get utilities (heat,electricity)? No         4/19/2024   Fall Risk   Fallen 2 or more times in the past year? No   Trouble with walking or balance? No          4/19/2024   Dental   Dentist two times every year? Yes         4/19/2024   TB Screening   Were you born outside of the [place]? No         Today's PHQ-2 Score:       4/19/2024     9:51 AM   PHQ-2 ( 1999 [employer])   Q1: Little interest or pleasure in doing things 0   Q2: Feeling down, depressed or hopeless 1   PHQ-2 Score 1   Q1: Little interest or pleasure in doing things Not at all   Q2: Feeling down, depressed or hopeless Several days   PHQ-2 Score 1           4/19/2024   Substance Use   Alcohol more than 3/day or more than 7/wk No   Do you use any other substances recreationally? No     Social History     Tobacco Use    Smoking status: Former     Current packs/day: 0.00     Average packs/day: 0.2 packs/day for 23.5 years (3.5 ttl pk-yrs)     Types: Cigarettes     Start date: 10/1/1992     Quit date: 4/1/2016     Years since  "quittin.0    Smokeless tobacco: Never   Substance Use Topics    Alcohol use: Yes     Alcohol/week: 1.7 standard drinks of alcohol    Drug use: Never              2024   One time HIV Screening   Previous HIV test? No         2024   STI Screening   New sexual partner(s) since last STI/HIV test? No   Last PSA:   Prostate Specific Antigen Screen   Date Value Ref Range Status   2022 0.76 0.00 - 4.00 ug/L Final     ASCVD Risk   The ASCVD Risk score (Louis CAMRAILLO, et al., 2019) failed to calculate for the following reasons:    The valid total cholesterol range is 130 to 320 mg/dL     Sexual health reviewed and updated as needed this visit by Provider          Review of Systems  CONSTITUTIONAL: NEGATIVE for fever, chills. Weight up 10 pounds  INTEGUMENTARY/SKIN: NEGATIVE for worrisome rashes, moles or lesions  EYES: NEGATIVE for  irritation. Has glasses and near vision worse. Eye exam 2 years ago.    ENT/MOUTH: NEGATIVE for ear, mouth and throat problems  RESP: NEGATIVE for significant cough or SOB  CV: NEGATIVE for chest pain, palpitations or peripheral edema  GI: NEGATIVE for nausea, abdominal pain, heartburn, or change in bowel habits. Colonoscopy due 1 year with hx polyps  : NEGATIVE for frequency, dysuria, or hematuria.. Na nocturia  MUSCULOSKELETAL: NEGATIVE for significant arthralgias or myalgia  NEURO: NEGATIVE for weakness, dizziness. Rare tingle in toes  ENDOCRINE: NEGATIVE for temperature intolerance. On statin  for CAD. Lipids done 2024  with [facility]. Has started lower carb intake. Had fasting glucose 114  last month  HEME: NEGATIVE for bleeding problems  PSYCHIATRIC: NEGATIVE for changes in mood or affect     Objective    Exam  /88   Pulse 59   Temp 97.8  F (36.6  C) (Temporal)   Ht 1.753 m (5' 9\")   Wt 109.4 kg (241 lb 3.2 oz)   SpO2 96%   BMI 35.62 kg/m     Estimated body mass index is 35.62 kg/m  as calculated from the following:    Height as of this encounter: 1.753 " "ayesha (5' 9\").    Weight as of this encounter: 109.4 kg (241 lb 3.2 oz).    Physical Exam  General appearance -   alert, no distress  Skin - No rashes or lesions. Multiple small seborrheic keratosis on trunk and back  Head - normocephalic, atraumatic  Eyes - AMY, EOMI, fundi exam with nondilated pupils negative.  Ears - External ears normal. Canals clear. TM's normal.  Nose/Sinuses - Nares normal. Septum midline. Mucosa normal. No drainage or sinus tenderness.  Oropharynx - No erythema, no adenopathy, no exudates.  Neck - Supple without adenopathy or thyromegaly. No bruits.  Lungs - Clear to auscultation without wheezes/rhonchi.  Heart - Regular rate and rhythm without murmurs, clicks, or gallops.  Nodes - No supraclavicular, axillary, or inguinal adenopathy palpable.  Abdomen - Abdomen obese, soft, non-tender. BS normal. No masses or hepatosplenomegaly palpable. No bruits.  Extremities -No cyanosis, clubbing or edema.    Musculoskeletal - Spine ROM normal. Muscular strength intact.   Peripheral pulses - radial=4/4, femoral=4/4, posterior tibial=4/4, dorsalis pedis=4/4,  Neuro - Gait normal. Reflexes normal and symmetric. Sensation grossly WNL.  Genital - Normal-appearing male external genitalia. No scrotal masses or inguinal hernia palpable.   Rectal - deferred        Signed Electronically by: Lior Smith MD     "

## 2024-04-21 NOTE — PATIENT INSTRUCTIONS
Td tetanus vaccination today  Would recommend Prevnar 20 vaccination for pneumonia risk reduction  Patient declines COVID vaccination  Consider Shingrix vaccine coverage for additional shingles prevention.  This is a 2 shot series done 2-6 months apart. Check with insurance or speak with your pharmacist re: insurance coverage first  Continue rosuvastatin management for cholesterol from your cardiologist  Colonoscopy screening due again in 1 year  Call  [phone] or use [employer] to schedule a future lab appointment  fasting in next couple weeks   For fasting labs, please refrain from eating for 8 hours or more.   Drink 2 glasses of water before your lab appointment. It is fine to take your  oral medications on the morning of the lab test as usual  Get a blood pressure recheck   in  the next couple  weeks at the  [facility] pharmacy when here for future lab appointment. You will need to go to [email or web address] to schedule the blood pressure check appointment.  Continue current medications  Reduce calorie/carbohydrate (sugar, bread, potato, pasta, rice, alcohol etc)  intake in diet.  Increase color on your plate with vegetables.Continue frequency of walking or other aerobic exercise   Pt was informed regarding extra E&M billing for management of new or established medical issues not related to today's wellness/screening visit   Posterior Auricular Interpolation Flap Text: A decision was made to reconstruct the defect utilizing an interpolation axial flap and a staged reconstruction.  A telfa template was made of the defect.  This telfa template was then used to outline the posterior auricular interpolation flap.  The donor area for the pedicle flap was then injected with anesthesia.  The flap was excised through the skin and subcutaneous tissue down to the layer of the underlying musculature.  The pedicle flap was carefully excised within this deep plane to maintain its blood supply.  The edges of the donor site were undermined.   The donor site was closed in a primary fashion.  The pedicle was then rotated into position and sutured.  Once the tube was sutured into place, adequate blood supply was confirmed with blanching and refill.  The pedicle was then wrapped with xeroform gauze and dressed appropriately with a telfa and gauze bandage to ensure continued blood supply and protect the attached pedicle.

## 2024-04-22 ENCOUNTER — LAB (OUTPATIENT)
Dept: LAB | Facility: CLINIC | Age: 64
End: 2024-04-22
Payer: COMMERCIAL

## 2024-04-22 DIAGNOSIS — E66.01 SEVERE OBESITY WITH BODY MASS INDEX (BMI) OF 35.0 TO 39.9 WITH COMORBIDITY (H): ICD-10-CM

## 2024-04-22 DIAGNOSIS — E79.0 ELEVATED URIC ACID IN BLOOD: ICD-10-CM

## 2024-04-22 DIAGNOSIS — Z12.5 SPECIAL SCREENING FOR MALIGNANT NEOPLASM OF PROSTATE: ICD-10-CM

## 2024-04-22 DIAGNOSIS — E03.9 HYPOTHYROIDISM, UNSPECIFIED TYPE: ICD-10-CM

## 2024-04-22 DIAGNOSIS — R73.9 BLOOD GLUCOSE ELEVATED: ICD-10-CM

## 2024-04-22 DIAGNOSIS — Z00.01 ENCOUNTER FOR ROUTINE ADULT MEDICAL EXAM WITH ABNORMAL FINDINGS: ICD-10-CM

## 2024-04-22 LAB
ALBUMIN SERPL BCG-MCNC: 4.7 G/DL (ref 3.5–5.2)
ALP SERPL-CCNC: 66 U/L (ref 40–150)
ALT SERPL W P-5'-P-CCNC: 37 U/L (ref 0–70)
ANION GAP SERPL CALCULATED.3IONS-SCNC: 10 MMOL/L (ref 7–15)
AST SERPL W P-5'-P-CCNC: 31 U/L (ref 0–45)
BILIRUB SERPL-MCNC: 0.4 MG/DL
BUN SERPL-MCNC: 21.2 MG/DL (ref 8–23)
CALCIUM SERPL-MCNC: 9.8 MG/DL (ref 8.8–10.2)
CHLORIDE SERPL-SCNC: 100 MMOL/L (ref 98–107)
CREAT SERPL-MCNC: 1.17 MG/DL (ref 0.67–1.17)
DEPRECATED HCO3 PLAS-SCNC: 26 MMOL/L (ref 22–29)
EGFRCR SERPLBLD CKD-EPI 2021: 70 ML/MIN/1.73M2
ERYTHROCYTE [DISTWIDTH] IN BLOOD BY AUTOMATED COUNT: 12.6 % (ref 10–15)
GLUCOSE SERPL-MCNC: 110 MG/DL (ref 70–99)
HBA1C MFR BLD: 6 % (ref 0–5.6)
HCT VFR BLD AUTO: 50.9 % (ref 40–53)
HGB BLD-MCNC: 17.1 G/DL (ref 13.3–17.7)
MCH RBC QN AUTO: 30.2 PG (ref 26.5–33)
MCHC RBC AUTO-ENTMCNC: 33.6 G/DL (ref 31.5–36.5)
MCV RBC AUTO: 90 FL (ref 78–100)
PLATELET # BLD AUTO: 204 10E3/UL (ref 150–450)
POTASSIUM SERPL-SCNC: 4.7 MMOL/L (ref 3.4–5.3)
PROT SERPL-MCNC: 7.7 G/DL (ref 6.4–8.3)
PSA SERPL DL<=0.01 NG/ML-MCNC: 0.92 NG/ML (ref 0–4.5)
RBC # BLD AUTO: 5.67 10E6/UL (ref 4.4–5.9)
SODIUM SERPL-SCNC: 136 MMOL/L (ref 135–145)
T4 FREE SERPL-MCNC: 0.93 NG/DL (ref 0.9–1.7)
TSH SERPL DL<=0.005 MIU/L-ACNC: 32 UIU/ML (ref 0.3–4.2)
URATE SERPL-MCNC: 7.7 MG/DL (ref 3.4–7)
WBC # BLD AUTO: 4.9 10E3/UL (ref 4–11)

## 2024-04-22 PROCEDURE — 84443 ASSAY THYROID STIM HORMONE: CPT

## 2024-04-22 PROCEDURE — 36415 COLL VENOUS BLD VENIPUNCTURE: CPT

## 2024-04-22 PROCEDURE — 85027 COMPLETE CBC AUTOMATED: CPT

## 2024-04-22 PROCEDURE — 83036 HEMOGLOBIN GLYCOSYLATED A1C: CPT

## 2024-04-22 PROCEDURE — G0103 PSA SCREENING: HCPCS

## 2024-04-22 PROCEDURE — 84439 ASSAY OF FREE THYROXINE: CPT

## 2024-04-22 PROCEDURE — 80053 COMPREHEN METABOLIC PANEL: CPT

## 2024-04-22 PROCEDURE — 86800 THYROGLOBULIN ANTIBODY: CPT

## 2024-04-22 PROCEDURE — 84550 ASSAY OF BLOOD/URIC ACID: CPT

## 2024-04-23 LAB — THYROGLOB AB SERPL IA-ACNC: <20 IU/ML

## 2025-01-22 ENCOUNTER — VIRTUAL VISIT (OUTPATIENT)
Dept: INTERNAL MEDICINE | Facility: CLINIC | Age: 65
End: 2025-01-22
Payer: COMMERCIAL

## 2025-01-22 DIAGNOSIS — Z12.5 SCREENING FOR PROSTATE CANCER: ICD-10-CM

## 2025-01-22 DIAGNOSIS — R73.9 BLOOD GLUCOSE ELEVATED: ICD-10-CM

## 2025-01-22 DIAGNOSIS — M1A.9XX0 CHRONIC GOUT WITHOUT TOPHUS, UNSPECIFIED CAUSE, UNSPECIFIED SITE: ICD-10-CM

## 2025-01-22 DIAGNOSIS — E66.01 SEVERE OBESITY WITH BODY MASS INDEX (BMI) OF 35.0 TO 39.9 WITH SERIOUS COMORBIDITY (H): ICD-10-CM

## 2025-01-22 DIAGNOSIS — E03.9 HYPOTHYROIDISM, UNSPECIFIED TYPE: ICD-10-CM

## 2025-01-22 DIAGNOSIS — E78.5 HYPERLIPIDEMIA LDL GOAL <70: ICD-10-CM

## 2025-01-22 PROBLEM — M10.9 ACUTE GOUT OF LEFT FOOT, UNSPECIFIED CAUSE: Status: RESOLVED | Noted: 2022-07-19 | Resolved: 2025-01-22

## 2025-01-22 PROCEDURE — 98006 SYNCH AUDIO-VIDEO EST MOD 30: CPT | Performed by: INTERNAL MEDICINE

## 2025-01-22 NOTE — PROGRESS NOTES
Levon is a 64 year old who is being evaluated via a billable video visit.    How would you like to obtain your AVS? [employer]  If the video visit is dropped, the invitation should be resent by: Text to cell phone: [phone]  Will anyone else be joining your video visit? No       ASSESSMENT:    1. Severe obesity with body mass index (BMI) of 35.0 to 39.9 with serious comorbidity (H)  ***  - Hemoglobin A1c; Future  - Comprehensive metabolic panel; Future  - Lipid panel reflex to direct LDL Fasting; Future  - TSH with free T4 reflex; Future    2. Blood glucose elevated  ***  - Hemoglobin A1c; Future  - Comprehensive metabolic panel; Future    3. Hypothyroidism, unspecified type  ***  - TSH with free T4 reflex; Future    4. Chronic gout without tophus, unspecified cause, unspecified site  ***  - Comprehensive metabolic panel; Future  - CBC with platelets; Future  - Uric acid; Future    5. Hyperlipidemia LDL goal <70  ***  - Comprehensive metabolic panel; Future  - Lipid panel reflex to direct LDL Fasting; Future    6. Screening for prostate cancer  ***  - Prostate Specific Antigen Screen; Future      PLAN:  Call  [phone] or use [employer] to schedule a future lab appointment  fasting in the next 1 week.   For fasting labs, please refrain from eating for 8 hours or more.   Drink 2 glasses of water before your lab appointment. It is fine to take your  oral medications on the morning of the lab test as usual  Reduce calorie/carbohydrate (sugar, bread, potato, pasta, rice, alcohol etc)  intake in diet.  Increase color on your plate with vegetables. Increase  frequency of walking or other aerobic exercise as able (goal is daily)  Recommendations will be based on lab results. Possible Metformin vs compounded GLP1 use        Video-Visit Details    Type of service:  Video Visit    Video Start Time: {video visit start/end time for provider to select:412939}    Video End Time:{video visit start/end time for provider to  "select:095444}    Originating Location (pt. Location): Home    Distant Location (provider location):  [facility]     Platform used for Video Visit: Rafal Dos Santos is a 64 year old, presenting for the following health issues:  Weight Loss    History of Present Illness       Vascular Disease:  He presents for follow up of vascular disease.      He takes daily aspirin.    Reason for visit:  Weight and heart concerns    He eats 2-3 servings of fruits and vegetables daily.He consumes 0 sweetened beverage(s) daily.He exercises with enough effort to increase his heart rate 30 to 60 minutes per day.  He exercises with enough effort to increase his heart rate 5 days per week.   He is taking medications regularly.       Most recent lab results reviewed with pt.      HPI:  ***     Additional ROS:   Constitutional, HEENT, Cardiovascular, Pulmonary, GI and , Neuro, MSK and Psych review of systems/symptoms are otherwise negative or unchanged from previous, except as noted above.           Objective :  ***    Physical Exam:  {video visit exam brief selected:874154::\"GENERAL: healthy, alert and no distress\",\"EYES: Eyes grossly normal to inspection, conjunctivae and sclerae normal\",\"RESP: no audible wheeze, cough, or visible cyanosis.  No visible retractions or increased work of breathing.  Able to speak fully in complete sentences.\",\"NEURO: Cranial nerves grossly intact, mentation intact and speech normal\",\"PSYCH: mentation appears normal, affect normal/bright, judgement and insight intact, normal speech and appearance well-groomed\"}       Lior Smith MD  Internal Medicine Department  [facility]  Internal Medicine Department      (Chart documentation was completed, in part, with [employer] voice-recognition software. Even though reviewed, some grammatical, spelling, and word errors may remain.)    " his heart rate 30 to 60 minutes per day.  He exercises with enough effort to increase his heart rate 5 days per week.   He is taking medications regularly.          HPI:  Having trouble losing weight.  Playing pickle ball 6 times since Fabian.  Occasionally doing some walking.  Has been trying to maintain portion control overall and cut down significantly on carbohydrate intake.  Trying to avoid sweets/sugars.  Patient has been more busy at work and not eating out there as much and brings more prepared meals from home.  Gets occasionally hungry late at night and will then eat some pretzels, fruit or some peanut butter crackers.  Has a  Diet Coke less than once a day.  2 cups of coffee in the morning.  Will have 1 glass of wine and 1 cocktail on weekends.  No other alcohol intake.  Normal bowel movements.  Denies recent gout flares.  History of CAD.  Denies chest pain, shortness of breath, palpitations  Patient last seen in April 2024.  Weight 241 pounds at that time with BMI 35.6.  Has not weighed himself recently but feels weight is similar.  Clothing feels the same with fitting    Additional ROS:   Constitutional, HEENT, Cardiovascular, Pulmonary, GI and , Neuro, MSK and Psych review of systems/symptoms are otherwise negative or unchanged from previous, except as noted above.           Component      Latest Ref Rng 4/22/2024  7:52 AM   Sodium      135 - 145 mmol/L 136    Potassium      3.4 - 5.3 mmol/L 4.7    Carbon Dioxide (CO2)      22 - 29 mmol/L 26    Anion Gap      7 - 15 mmol/L 10    Urea Nitrogen      8.0 - 23.0 mg/dL 21.2    Creatinine      0.67 - 1.17 mg/dL 1.17    GFR Estimate      >60 mL/min/1.73m2 70    Calcium      8.8 - 10.2 mg/dL 9.8    Chloride      98 - 107 mmol/L 100    Glucose      70 - 99 mg/dL 110 (H)    Alkaline Phosphatase      40 - 150 U/L 66    AST      0 - 45 U/L 31    ALT      0 - 70 U/L 37    Protein Total      6.4 - 8.3 g/dL 7.7    Albumin      3.5 - 5.2 g/dL 4.7    Bilirubin  Total      <=1.2 mg/dL 0.4    WBC      4.0 - 11.0 10e3/uL 4.9    RBC Count      4.40 - 5.90 10e6/uL 5.67    Hemoglobin      13.3 - 17.7 g/dL 17.1    Hematocrit      40.0 - 53.0 % 50.9    MCV      78 - 100 fL 90    MCH      26.5 - 33.0 pg 30.2    MCHC      31.5 - 36.5 g/dL 33.6    RDW      10.0 - 15.0 % 12.6    Platelet Count      150 - 450 10e3/uL 204    Hemoglobin A1C      0.0 - 5.6 % 6.0 (H)    TSH      0.30 - 4.20 uIU/mL 32.00 (H)    Prostate Specific Antigen Screen      0.00 - 4.50 ng/mL 0.92    Thyroglobulin Antibody      <40 IU/mL <20    Uric Acid      3.4 - 7.0 mg/dL 7.7 (H)    T4 Free      0.90 - 1.70 ng/dL 0.93       Legend:  (H) High    Objective :  No vitals obtained today    Physical Exam:  GENERAL:   alert and no distress  EYES: Eyes grossly normal to inspection, conjunctivae and sclerae normal  RESP: no audible wheeze, cough, or visible cyanosis.  No visible retractions or increased work of breathing.  Able to speak fully in complete sentences.  NEURO: Cranial nerves grossly intact, mentation intact and speech normal  PSYCH: mentation appears normal, affect normal/bright, judgement and insight intact, normal speech and appearance well-groomed       Lior Smith MD  Internal Medicine Department  [facility]  Internal Medicine Department      (Chart documentation was completed, in part, with [employer] voice-recognition software. Even though reviewed, some grammatical, spelling, and word errors may remain.)

## 2025-01-24 ENCOUNTER — TELEPHONE (OUTPATIENT)
Dept: INTERNAL MEDICINE | Facility: CLINIC | Age: 65
End: 2025-01-24

## 2025-01-24 NOTE — TELEPHONE ENCOUNTER
74 FYI patient did present to lab and had labs drawn. Should I call lab and cancel the lipid panel and tell patient to come back for a fasting redraw?     Thank you-    Sally Sosa RN

## 2025-01-24 NOTE — TELEPHONE ENCOUNTER
To PCP:    Patient had coffee and a bowl of cereal this morning at 0900.    Patient has labs at 1:15 PM today and is wondering if he needs to reschedule to when he is fasting?    Please advise, thank you.    Tres Willard RN  [facility] Internal Medicine

## 2025-01-24 NOTE — TELEPHONE ENCOUNTER
Labs are to be done fasting so would recommend he reschedule lab appt. For fasting labs, please refrain from eating for 8 hours or more.   Drink 2 glasses of water before your lab appointment. It is fine to take your  oral medications on the morning of the lab test as usual.  Inform pt

## 2025-02-03 ENCOUNTER — MYC MEDICAL ADVICE (OUTPATIENT)
Dept: INTERNAL MEDICINE | Facility: CLINIC | Age: 65
End: 2025-02-03
Payer: COMMERCIAL

## 2025-02-08 ENCOUNTER — MYC MEDICAL ADVICE (OUTPATIENT)
Dept: INTERNAL MEDICINE | Facility: CLINIC | Age: 65
End: 2025-02-08
Payer: COMMERCIAL

## 2025-02-08 DIAGNOSIS — E78.5 HYPERLIPIDEMIA LDL GOAL <70: ICD-10-CM

## 2025-02-08 DIAGNOSIS — E03.9 HYPOTHYROIDISM, UNSPECIFIED TYPE: Primary | ICD-10-CM

## 2025-02-11 ENCOUNTER — TELEPHONE (OUTPATIENT)
Dept: INTERNAL MEDICINE | Facility: CLINIC | Age: 65
End: 2025-02-11
Payer: COMMERCIAL

## 2025-02-11 NOTE — TELEPHONE ENCOUNTER
Test Results    Contacts       Contact Date/Time Type Contact Phone/Fax    02/11/2025 04:13 PM CST Phone (Incoming) Levon Barron (Self) [phone] (M)            Who ordered the test:  Dr. Smith    Type of test: Labs    Date of test:  1-22-25    Where was the test performed:  [place]    What are your questions/concerns?:  Please call to discuss results and what the next steps are. Thank you.    Could we send this information to you in [employer] or would you prefer to receive a phone call?:   Patient would prefer a phone call   Okay to leave a detailed message?: Yes at Cell number on file:    Telephone Information:   Mobile [phone]

## 2025-02-16 RX ORDER — LEVOTHYROXINE SODIUM 100 UG/1
100 TABLET ORAL
Qty: 90 TABLET | Refills: 1 | Status: SHIPPED | OUTPATIENT
Start: 2025-02-16

## 2025-03-10 ENCOUNTER — PATIENT OUTREACH (OUTPATIENT)
Dept: GASTROENTEROLOGY | Facility: CLINIC | Age: 65
End: 2025-03-10
Payer: COMMERCIAL

## 2025-03-10 DIAGNOSIS — Z12.11 SPECIAL SCREENING FOR MALIGNANT NEOPLASMS, COLON: Primary | ICD-10-CM

## 2025-03-10 NOTE — PROGRESS NOTES
"Patients last colonoscopy on file was on 6/9/22 and was recommended to repeat in 3 years.  CRC Screening Colonoscopy Referral Review    Patient meets the inclusion criteria for screening colonoscopy standing order.    Ordering/Referring Provider:  Lior Smith      BMI: Estimated body mass index is 35.62 kg/m  as calculated from the following:    Height as of 4/19/24: 1.753 m (5' 9\").    Weight as of 4/19/24: 109.4 kg (241 lb 3.2 oz).     Sedation:  Does patient have any of the following conditions affecting sedation?  No medical conditions affecting sedation.    Previous Scopes:  Any previous recommendations or follow up needs based on previous scope?  na / No recommendations.    Medical Concerns to Postpone Order:  Does patient have any of the following medical concerns that should postpone/delay colonoscopy referral?  No medical conditions affecting colonoscopy referral.    Final Referral Details:  Based on patient's medical history patient is appropriate for referral order with moderate sedation. If patient's BMI > 50 do not schedule in ASC.  "

## 2025-04-08 ENCOUNTER — LAB (OUTPATIENT)
Dept: LAB | Facility: CLINIC | Age: 65
End: 2025-04-08
Payer: COMMERCIAL

## 2025-04-08 DIAGNOSIS — E78.5 HYPERLIPIDEMIA LDL GOAL <70: ICD-10-CM

## 2025-04-08 DIAGNOSIS — E03.9 HYPOTHYROIDISM, UNSPECIFIED TYPE: ICD-10-CM

## 2025-04-08 LAB
CHOLEST SERPL-MCNC: 114 MG/DL
FASTING STATUS PATIENT QL REPORTED: YES
HDLC SERPL-MCNC: 44 MG/DL
LDLC SERPL CALC-MCNC: 60 MG/DL
NONHDLC SERPL-MCNC: 70 MG/DL
TRIGL SERPL-MCNC: 52 MG/DL
TSH SERPL DL<=0.005 MIU/L-ACNC: 2.69 UIU/ML (ref 0.3–4.2)

## 2025-04-08 PROCEDURE — 84443 ASSAY THYROID STIM HORMONE: CPT

## 2025-04-08 PROCEDURE — 36415 COLL VENOUS BLD VENIPUNCTURE: CPT

## 2025-04-08 PROCEDURE — 80061 LIPID PANEL: CPT

## 2025-06-07 ENCOUNTER — HEALTH MAINTENANCE LETTER (OUTPATIENT)
Age: 65
End: 2025-06-07

## 2025-06-10 ENCOUNTER — TRANSFERRED RECORDS (OUTPATIENT)
Dept: ADMINISTRATIVE | Facility: CLINIC | Age: 65
End: 2025-06-10
Payer: COMMERCIAL

## 2025-06-11 PROBLEM — D12.6 ADENOMATOUS POLYP OF COLON: Status: ACTIVE | Noted: 2025-06-11

## 2025-06-11 NOTE — PROCEDURES
[facility]   [address] [place] [address], [address], [place] [address]     Patient Name: Levon Barron  Gender:  Male  Exam Date: 06/10/2025 Visit Number:  [id number]  Age: 64 Years YOB: 1960  Attending MD: Kleber Mccray MD Medical Record#:  [id number]    Procedure: Colonoscopy   Indications: Previous advanced adenomatous polyp(s)      Referring MD: Referral Self  Primary MD:      MD No Primary  Medications: Admitting Medications:   0.9% Normal Saline at TKO   Ondansetron Hydrochloride (Zofran) given  4mg  by IV   Intra Procedure Medications:   Patient received monitored anesthesia care.     Complications: No immediate complications  ______________________________________________________________________________  Procedure:   An examination of the heart and lungs was performed and found to be within acceptable limits.  .  The patient was therefore deemed a reasonable candidate for endoscopy and sedation.   The risks and benefits of the procedure were explained to the patient.After obtaining informed consent, the patient received monitored anesthesia care and I passed the scope   without difficulty via the rectum to the cecum.  The appendiceal orifice and ic valve were identified.  The scope was retroflexed during the examination  The quality of the prep was excellent  (Miralax/Gatorade/2 tablets Bisacodyl/Magnesium Citrate).    This was a complete examination throughout the entire colon.    Findings:    Polyp location: cecum.  Quantity: 1.  Size: 5 mm.  Polyp shape:  sessile.         Maneuver: polypectomy was performed with a cold snare.       Removal:  complete.  Retrieval: complete.  Bleeding: none.    Polyp location: descending colon.  Quantity: 1.  Size: 5 mm.  Polyp shape: sessile.         Maneuver: polypectomy was performed with a  cold snare.       Removal: complete.  Retrieval: complete.  Bleeding: none.    Diverticulosis.  Location: - descending colon - sigmoid.         Quantity:  several.  Anal canal:  small internal hemorrhoid(s)    Impression:  Colorectal polyps  Diverticulosis of colon without diverticulitis  Hemorrhoids, internal    Preliminary Plan:  The patient and their physician will receive a copy of the pathology report as well as pathology-based recommendations for future screening or surveillance.  Repeat colonoscopy in 5 years for polyp surveillance  Pathology Results:  A: COLON, CECUM, POLYP:           1. Tubular adenoma           2. Negative for high grade dysplasia           3. Per the colonoscopy report:               a. Polyp size: 5 mm               b. Resection: Complete               c. Retrieval: Complete      B: COLON, DESCENDING, POLYP:           1. Tubular adenoma           2. Negative for high grade dysplasia           3. Per the colonoscopy report:               a. Polyp size: 5 mm               b. Resection: Complete               c. Retrieval: Complete      MICROSCOPIC  A: Performed   B: Performed     Electronically signed by: Audie Richmond MD    Interpreted at [facility], [address] [place] [id number]    Orders    Instruction(s)/Education:  Instruction/Education Timeframe Assessment   Colon Cancer Prevention  K63.5   Colon Polyps  K63.5   Diverticulosis/Diverticulitis  K63.5   Hemorrhoids (Internal)  K63.5   high fiber diet  K63.5       Final Plan:  Repeat colonoscopy in 5 years for Polyp surveillance.    We will attempt to contact you at appropriate intervals via [place] mail.  We may not be able to find you or contact you at that time, therefore you should know that the responsibility for following our recommendation rests with you.  If you don't hear from us at the time your procedure is due, please contact our office to schedule an appointment.  If your contact information should change, please contact our office so that we can update your record.      _Electronically signed  by:___________________  Kleber Mccray MD                 06/10/2025    cc: MD Alondra Torres

## 2025-08-11 DIAGNOSIS — E03.9 HYPOTHYROIDISM, UNSPECIFIED TYPE: ICD-10-CM

## 2025-08-11 RX ORDER — LEVOTHYROXINE SODIUM 100 UG/1
TABLET ORAL
Qty: 90 TABLET | Refills: 0 | Status: SHIPPED | OUTPATIENT
Start: 2025-08-11

## 2025-08-13 DIAGNOSIS — E03.9 HYPOTHYROIDISM, UNSPECIFIED TYPE: ICD-10-CM

## 2025-08-13 RX ORDER — LEVOTHYROXINE SODIUM 100 UG/1
TABLET ORAL
Qty: 90 TABLET | Refills: 0 | OUTPATIENT
Start: 2025-08-13